# Patient Record
Sex: FEMALE | Race: BLACK OR AFRICAN AMERICAN | NOT HISPANIC OR LATINO | URBAN - METROPOLITAN AREA
[De-identification: names, ages, dates, MRNs, and addresses within clinical notes are randomized per-mention and may not be internally consistent; named-entity substitution may affect disease eponyms.]

---

## 2019-04-12 ENCOUNTER — HOSPITAL ENCOUNTER (OUTPATIENT)
Dept: URGENT CARE | Facility: CLINIC | Age: 49
Discharge: HOME | End: 2019-04-12

## 2019-04-14 LAB — BACTERIA UR CULT: NORMAL

## 2019-05-14 ENCOUNTER — HOSPITAL ENCOUNTER (OUTPATIENT)
Dept: URGENT CARE | Facility: CLINIC | Age: 49
Discharge: HOME | End: 2019-05-14
Attending: FAMILY MEDICINE

## 2020-06-23 ENCOUNTER — HOSPITAL ENCOUNTER (OUTPATIENT)
Dept: URGENT CARE | Facility: CLINIC | Age: 50
Discharge: HOME | End: 2020-06-23
Attending: EMERGENCY MEDICINE

## 2020-06-24 LAB — SARS-COV-2 RNA SPEC QL NAA+PROBE: NOT DETECTED

## 2022-01-11 PROCEDURE — U0004 COV-19 TEST NON-CDC HGH THRU: HCPCS | Performed by: EMERGENCY MEDICINE

## 2022-01-12 ENCOUNTER — TELEPHONE (OUTPATIENT)
Dept: URGENT CARE | Facility: CLINIC | Age: 52
End: 2022-01-12

## 2022-06-09 PROCEDURE — U0004 COV-19 TEST NON-CDC HGH THRU: HCPCS

## 2022-06-10 ENCOUNTER — TELEPHONE (OUTPATIENT)
Dept: URGENT CARE | Facility: CLINIC | Age: 52
End: 2022-06-10

## 2022-09-07 ENCOUNTER — OFFICE VISIT (OUTPATIENT)
Dept: INTERNAL MEDICINE | Facility: CLINIC | Age: 52
End: 2022-09-07

## 2022-09-07 VITALS
BODY MASS INDEX: 29.65 KG/M2 | SYSTOLIC BLOOD PRESSURE: 137 MMHG | OXYGEN SATURATION: 98 % | WEIGHT: 184.5 LBS | TEMPERATURE: 97.8 F | HEIGHT: 66 IN | DIASTOLIC BLOOD PRESSURE: 85 MMHG | HEART RATE: 74 BPM

## 2022-09-07 DIAGNOSIS — N95.1 POST MENOPAUSAL SYNDROME: ICD-10-CM

## 2022-09-07 DIAGNOSIS — Z13.220 SCREENING FOR LIPID DISORDERS: ICD-10-CM

## 2022-09-07 DIAGNOSIS — Z00.00 ANNUAL PHYSICAL EXAM: ICD-10-CM

## 2022-09-07 DIAGNOSIS — Z12.31 ENCOUNTER FOR SCREENING MAMMOGRAM FOR MALIGNANT NEOPLASM OF BREAST: ICD-10-CM

## 2022-09-07 DIAGNOSIS — Z11.59 NEED FOR HEPATITIS C SCREENING TEST: ICD-10-CM

## 2022-09-07 DIAGNOSIS — Z23 ENCOUNTER FOR IMMUNIZATION: ICD-10-CM

## 2022-09-07 DIAGNOSIS — Z00.00 ENCOUNTER FOR MEDICAL EXAMINATION TO ESTABLISH CARE: Primary | ICD-10-CM

## 2022-09-07 DIAGNOSIS — Z12.11 SCREENING FOR COLON CANCER: ICD-10-CM

## 2022-09-07 DIAGNOSIS — N95.1 HOT FLASHES DUE TO MENOPAUSE: ICD-10-CM

## 2022-09-07 LAB
BASOPHILS # BLD AUTO: 0.03 10*3/MM3 (ref 0–0.2)
BASOPHILS NFR BLD AUTO: 0.7 % (ref 0–1.5)
DEPRECATED RDW RBC AUTO: 39.1 FL (ref 37–54)
EOSINOPHIL # BLD AUTO: 0.06 10*3/MM3 (ref 0–0.4)
EOSINOPHIL NFR BLD AUTO: 1.3 % (ref 0.3–6.2)
ERYTHROCYTE [DISTWIDTH] IN BLOOD BY AUTOMATED COUNT: 12.3 % (ref 12.3–15.4)
HCT VFR BLD AUTO: 41.4 % (ref 34–46.6)
HGB BLD-MCNC: 13.4 G/DL (ref 12–15.9)
IMM GRANULOCYTES # BLD AUTO: 0.01 10*3/MM3 (ref 0–0.05)
IMM GRANULOCYTES NFR BLD AUTO: 0.2 % (ref 0–0.5)
LYMPHOCYTES # BLD AUTO: 1.63 10*3/MM3 (ref 0.7–3.1)
LYMPHOCYTES NFR BLD AUTO: 36.5 % (ref 19.6–45.3)
MCH RBC QN AUTO: 28 PG (ref 26.6–33)
MCHC RBC AUTO-ENTMCNC: 32.4 G/DL (ref 31.5–35.7)
MCV RBC AUTO: 86.4 FL (ref 79–97)
MONOCYTES # BLD AUTO: 0.34 10*3/MM3 (ref 0.1–0.9)
MONOCYTES NFR BLD AUTO: 7.6 % (ref 5–12)
NEUTROPHILS NFR BLD AUTO: 2.39 10*3/MM3 (ref 1.7–7)
NEUTROPHILS NFR BLD AUTO: 53.7 % (ref 42.7–76)
NRBC BLD AUTO-RTO: 0 /100 WBC (ref 0–0.2)
PLATELET # BLD AUTO: 245 10*3/MM3 (ref 140–450)
PMV BLD AUTO: 11.6 FL (ref 6–12)
RBC # BLD AUTO: 4.79 10*6/MM3 (ref 3.77–5.28)
WBC NRBC COR # BLD: 4.46 10*3/MM3 (ref 3.4–10.8)

## 2022-09-07 PROCEDURE — 85025 COMPLETE CBC W/AUTO DIFF WBC: CPT | Performed by: NURSE PRACTITIONER

## 2022-09-07 PROCEDURE — 86803 HEPATITIS C AB TEST: CPT | Performed by: NURSE PRACTITIONER

## 2022-09-07 PROCEDURE — 84443 ASSAY THYROID STIM HORMONE: CPT | Performed by: NURSE PRACTITIONER

## 2022-09-07 PROCEDURE — 80061 LIPID PANEL: CPT | Performed by: NURSE PRACTITIONER

## 2022-09-07 PROCEDURE — 80053 COMPREHEN METABOLIC PANEL: CPT | Performed by: NURSE PRACTITIONER

## 2022-09-07 PROCEDURE — 99396 PREV VISIT EST AGE 40-64: CPT | Performed by: NURSE PRACTITIONER

## 2022-09-07 RX ORDER — CITALOPRAM 20 MG/1
20 TABLET ORAL DAILY
Qty: 90 TABLET | Refills: 1 | Status: SHIPPED | OUTPATIENT
Start: 2022-09-07 | End: 2023-03-01

## 2022-09-07 NOTE — PROGRESS NOTES
"Chief Complaint  Establish Care - Postmenopausal     Subjective          Arabella Soto presents to [facility]  History of Present Illness    Previous PCP: patient does not remember who previous PCP was  Specialist(s): none currently   COVID vaccine: completed, including first booster  Pneumonia vaccine: never  Shingles vaccine: never  Colon cancer screening: has never completed, no FHx of colon cancer  Mammogram: has never had one  Pap Smear: about 2009  [place] 11 years     52-year-old female patient presents to the clinic today to establish care.  Patient states that she has not seen a primary care provider in many years.  Patient states that her only concern at this time is postmenopausal symptoms such as hot flashes which are her biggest annoyance at this time.  Patient was inquiring about hormone replacement therapy.  Denies any other concerns at this time including chest pain, shortness of breath, lower extremity edema, nausea, vomiting, abdominal pain.    Current Outpatient Medications   Medication Instructions   • ASPIRIN PO 1 tablet, Oral, Daily, chewable    • atorvastatin (LIPITOR) 20 mg, Oral, Nightly   • citalopram (CELEXA) 20 mg, Oral, Daily, Take 1/2 tab daily x5 days then increase to 1 tab daily       The following portions of the patient's history were reviewed and updated as appropriate: allergies, current medications, past family history, past medical history, past social history, past surgical history, and problem list.    Objective   Vital Signs:   /85   Pulse 74   Temp 97.8 °F (36.6 °C) (Temporal)   Ht 166.4 cm (65.5\")   Wt 83.7 kg (184 lb 8 oz)   SpO2 98%   BMI 30.24 kg/m²     Wt Readings from Last 3 Encounters:   09/07/22 83.7 kg (184 lb 8 oz)   06/09/22 84.7 kg (186 lb 12.8 oz)   01/11/22 82.1 kg (181 lb)     BP Readings from Last 3 Encounters:   09/07/22 137/85   06/09/22 126/80   01/11/22 129/87     Physical Exam "   Appearance: No acute distress, well-nourished  Head: normocephalic, atraumatic  Eyes: extraocular movements intact, no scleral icterus, no conjunctival injection  Ears, Nose, and Throat: external ears normal, nares patent, moist mucous membranes  Cardiovascular: regular rate and rhythm. no murmurs, rubs, or gallops. no edema  Respiratory: breathing comfortably, symmetric chest rise, clear to auscultation bilaterally. No wheezes, rales, or rhonchi.  Neuro: alert and oriented to time, place, and person. Normal gait  Psych: normal mood and affect     Result Review :   The following data was reviewed by: LACHELLE Gil on 09/07/2022:  Common labs    Common Labsle 9/7/22 9/7/22 9/7/22    1216 1216 1216   Glucose  86    BUN  10    Creatinine  0.77    Sodium  138    Potassium  4.0    Chloride  101    Calcium  9.7    Albumin  4.50    Total Bilirubin  <0.2    Alkaline Phosphatase  117    AST (SGOT)  15    ALT (SGPT)  14    WBC 4.46     Hemoglobin 13.4     Hematocrit 41.4     Platelets 245     Total Cholesterol   228 (A)   Triglycerides   87   HDL Cholesterol   50   LDL Cholesterol    163 (A)   (A) Abnormal value                   Lab Results   Component Value Date    SARSANTIGEN Not Detected 06/09/2022    COVID19 Detected (C) 06/09/2022    RAPFLUA Negative 06/09/2022    RAPFLUB Negative 06/09/2022    RAPSCRN Negative 06/09/2022       Procedures        Assessment and Plan    Diagnoses and all orders for this visit:    1. Encounter for medical examination to establish care (Primary)    2. Screening for lipid disorders  -     Lipid Panel    3. Annual physical exam  -     CBC & Differential  -     TSH Rfx On Abnormal To Free T4  -     Comprehensive Metabolic Panel    4. Need for hepatitis C screening test  -     HCV Antibody Rfx To Qnt PCR  -     Interpretation:    5. Screening for colon cancer  -     Ambulatory Referral For Screening Colonoscopy    6. Encounter for screening mammogram for malignant neoplasm of  breast  -     Mammo Screening Bilateral With CAD; Future    7. Encounter for immunization  -     Shingrix Vaccine    8. Post menopausal syndrome  Comments:  new dx   Orders:  -     citalopram (CeleXA) 20 MG tablet; Take 1 tablet by mouth Daily. Take 1/2 tab daily x5 days then increase to 1 tab daily  Dispense: 90 tablet; Refill: 1    9. Hot flashes due to menopause  -     citalopram (CeleXA) 20 MG tablet; Take 1 tablet by mouth Daily. Take 1/2 tab daily x5 days then increase to 1 tab daily  Dispense: 90 tablet; Refill: 1      Explained risk and benefits of hormone replacement therapy and that at did not feel that the risk associated with increased cancer risk were worth the benefit to her.  Patient agreeable to trial Celexa to target postmenopausal symptoms.    Advised on diet, physical activity, sunscreen, helmet, texting and driving, etc    There are no discontinued medications.       Follow Up   Return in about 3 months (around 12/7/2022) for Post menopausal , PAP; recheck BP (elevated).  Patient was given instructions and counseling regarding her condition or for health maintenance advice. Please see specific information pulled into the AVS if appropriate.       Ana Crowe, APRN  09/13/22  12:26 EDT

## 2022-09-08 LAB
ALBUMIN SERPL-MCNC: 4.5 G/DL (ref 3.5–5.2)
ALBUMIN/GLOB SERPL: 1.5 G/DL
ALP SERPL-CCNC: 117 U/L (ref 39–117)
ALT SERPL W P-5'-P-CCNC: 14 U/L (ref 1–33)
ANION GAP SERPL CALCULATED.3IONS-SCNC: 9 MMOL/L (ref 5–15)
AST SERPL-CCNC: 15 U/L (ref 1–32)
BILIRUB SERPL-MCNC: <0.2 MG/DL (ref 0–1.2)
BUN SERPL-MCNC: 10 MG/DL (ref 6–20)
BUN/CREAT SERPL: 13 (ref 7–25)
CALCIUM SPEC-SCNC: 9.7 MG/DL (ref 8.6–10.5)
CHLORIDE SERPL-SCNC: 101 MMOL/L (ref 98–107)
CHOLEST SERPL-MCNC: 228 MG/DL (ref 0–200)
CO2 SERPL-SCNC: 28 MMOL/L (ref 22–29)
CREAT SERPL-MCNC: 0.77 MG/DL (ref 0.57–1)
EGFRCR SERPLBLD CKD-EPI 2021: 92.9 ML/MIN/1.73
GLOBULIN UR ELPH-MCNC: 3 GM/DL
GLUCOSE SERPL-MCNC: 86 MG/DL (ref 65–99)
HDLC SERPL-MCNC: 50 MG/DL (ref 40–60)
LDLC SERPL CALC-MCNC: 163 MG/DL (ref 0–100)
LDLC/HDLC SERPL: 3.21 {RATIO}
POTASSIUM SERPL-SCNC: 4 MMOL/L (ref 3.5–5.2)
PROT SERPL-MCNC: 7.5 G/DL (ref 6–8.5)
SODIUM SERPL-SCNC: 138 MMOL/L (ref 136–145)
TRIGL SERPL-MCNC: 87 MG/DL (ref 0–150)
TSH SERPL DL<=0.05 MIU/L-ACNC: 0.55 UIU/ML (ref 0.27–4.2)
VLDLC SERPL-MCNC: 15 MG/DL (ref 5–40)

## 2022-09-09 ENCOUNTER — TELEPHONE (OUTPATIENT)
Dept: INTERNAL MEDICINE | Facility: CLINIC | Age: 52
End: 2022-09-09

## 2022-09-09 DIAGNOSIS — E78.2 MIXED HYPERLIPIDEMIA: Primary | ICD-10-CM

## 2022-09-09 LAB
HCV AB S/CO SERPL IA: <0.1 S/CO RATIO (ref 0–0.9)
HCV AB SERPL QL IA: NORMAL

## 2022-09-09 RX ORDER — ATORVASTATIN CALCIUM 20 MG/1
20 TABLET, FILM COATED ORAL NIGHTLY
Qty: 90 TABLET | Refills: 3 | Status: SHIPPED | OUTPATIENT
Start: 2022-09-09

## 2022-09-09 NOTE — TELEPHONE ENCOUNTER
----- Message from LACHELLE Gil sent at 9/9/2022 10:15 AM EDT -----    Cholesterol levels elevated. I would like to start patient on cholesterol medication to prevent cardiovascular event such as heart attack or stroke. We will repeat in 3 months. I would also like patient to follow low cholesterol diet and increase physical activity.     Other labs unremarkable.     Foods rich in unsaturated fats: e.g. olive and canola oil, avocados, almonds, pecans, walnuts  Low carbohydrate and fiber rich foods: e.g. whole grains, whole wheat bread and pasta, oatmeal, oat bran, brown rice, barley, legumes  Low carbohydrate and fibre rich foods: e.g. whole grains, whole wheat bread and pasta, oatmeal, oat bran, brown rice, barley, legumes  Fatty fish: e.g. salmon, herring  Phytosterols and stanols: e.g. nuts, seeds, vegetable oils, fortified food products such as orange juice, yogurt, salad dressing  Foods to avoid:    High cholesterol foods: e.g. egg yolks, whole milk products, and organ meats  Canola oil and Other processed vegetable oils  Potato chips and Other packaged Foods  Donnelly and Other processed meats

## 2022-09-09 NOTE — TELEPHONE ENCOUNTER
ATTEMPTED TO CONTACT PATIENT. COULD NOT LEAVE MESSAGE DUE TO NO VOICEMAIL.      HUB OK TO READ/ADVISE:  LACHELLE Crowe sent at 9/9/2022 10:15 AM EDT -----     Cholesterol levels elevated. I would like to start patient on cholesterol medication to prevent cardiovascular event such as heart attack or stroke. We will repeat in 3 months. I would also like patient to follow low cholesterol diet and increase physical activity.      Other labs unremarkable.      Foods rich in unsaturated fats: e.g. olive and canola oil, avocados, almonds, pecans, walnuts  Low carbohydrate and fiber rich foods: e.g. whole grains, whole wheat bread and pasta, oatmeal, oat bran, brown rice, barley, legumes  Low carbohydrate and fibre rich foods: e.g. whole grains, whole wheat bread and pasta, oatmeal, oat bran, brown rice, barley, legumes  Fatty fish: e.g. salmon, herring  Phytosterols and stanols: e.g. nuts, seeds, vegetable oils, fortified food products such as orange juice, yogurt, salad dressing  Foods to avoid:     High cholesterol foods: e.g. egg yolks, whole milk products, and organ meats  Canola oil and Other processed vegetable oils  Potato chips and Other packaged Foods  Donnelly and Other processed meats

## 2022-09-12 ENCOUNTER — TELEPHONE (OUTPATIENT)
Dept: INTERNAL MEDICINE | Facility: CLINIC | Age: 52
End: 2022-09-12

## 2022-10-12 ENCOUNTER — TRANSCRIBE ORDERS (OUTPATIENT)
Dept: ADMINISTRATIVE | Facility: HOSPITAL | Age: 52
End: 2022-10-12

## 2022-10-12 ENCOUNTER — TELEPHONE (OUTPATIENT)
Dept: INTERNAL MEDICINE | Facility: CLINIC | Age: 52
End: 2022-10-12

## 2022-10-12 DIAGNOSIS — Z12.31 ENCOUNTER FOR SCREENING MAMMOGRAM FOR MALIGNANT NEOPLASM OF BREAST: Primary | ICD-10-CM

## 2022-10-12 NOTE — TELEPHONE ENCOUNTER
PATIENT CALLED REGARDING LETTER SENT TO HER IN MAIL PER REFERRAL AND OUR INABILITY TO CONTACT PATIENT, CALL TRANSFERRED TO REFERRALS.

## 2022-10-19 ENCOUNTER — HOSPITAL ENCOUNTER (OUTPATIENT)
Dept: MAMMOGRAPHY | Facility: HOSPITAL | Age: 52
Discharge: HOME | End: 2022-10-19
Admitting: NURSE PRACTITIONER

## 2022-10-19 DIAGNOSIS — Z12.31 ENCOUNTER FOR SCREENING MAMMOGRAM FOR MALIGNANT NEOPLASM OF BREAST: ICD-10-CM

## 2022-10-19 PROCEDURE — 77067 SCR MAMMO BI INCL CAD: CPT

## 2022-10-19 PROCEDURE — 77063 BREAST TOMOSYNTHESIS BI: CPT

## 2022-12-07 NOTE — PROGRESS NOTES
Chief Complaint  Elevated Blood Pressure (3 month follow-up) and Gynecologic Exam    Subjective          Arabella Soto presents to [facility]  Hypertension  This is a new problem. The current episode started today. Pertinent negatives include no anxiety, blurred vision, chest pain, headaches, malaise/fatigue, neck pain, orthopnea, palpitations, peripheral edema, PND, shortness of breath or sweats. Past treatments include nothing. Current antihypertension treatment includes nothing. There is no history of chronic renal disease.   Hyperlipidemia  This is a chronic problem. The problem is uncontrolled. Recent lipid tests were reviewed and are high. She has no history of chronic renal disease, diabetes, hypothyroidism or liver disease. Pertinent negatives include no chest pain or shortness of breath. Current antihyperlipidemic treatment includes exercise, diet change and statins. Improvement on treatment: will recheck lipids today  Compliance problems include adherence to exercise.  Risk factors for coronary artery disease include hypertension and post-menopausal.       Menstrual History  Age of menarche: 12  Cycle length: No longer having cycle, abnormal bleeding  Flow: (light, medium, heavy): Heavy  LMP date: Unknown  Post menopausal?: Currently going through   Age of menopause: 52    Gynecologic History:  Self breast exams: Yes  Recent Mammogram: 10/19/2022  Previous GYN surgery: Ablation, Tubal, Scope of some type  History of infertility: No  Last PAP smear: More than 5 years, probably more than 10 years  Have you ever had an abnormal PAP?: Yes    Contraceptive/Sexual history:   Current contraceptive method?: None  Sexually active?: Yes  Number of partners?: 1  New partner within the last 3 months?: 0  Condom use?: No  History of STDs? Wart or something that was froze off, hx of abnormal pap... HPV?  Painful intercourse?: No    Current Outpatient Medications  "  Medication Instructions   • amLODIPine (NORVASC) 2.5 mg, Oral, Daily   • atorvastatin (LIPITOR) 20 mg, Oral, Nightly   • citalopram (CELEXA) 20 mg, Oral, Daily, Take 1/2 tab daily x5 days then increase to 1 tab daily       The following portions of the patient's history were reviewed and updated as appropriate: allergies, current medications, past family history, past medical history, past social history, past surgical history, and problem list.    Objective   Vital Signs:   /85 (BP Location: Right arm, Patient Position: Sitting, Cuff Size: Adult)   Pulse 71   Temp 97.5 °F (36.4 °C) (Temporal)   Ht 166.4 cm (65.5\")   Wt 78.9 kg (174 lb)   SpO2 97%   BMI 28.51 kg/m²     Wt Readings from Last 3 Encounters:   12/08/22 78.9 kg (174 lb)   09/07/22 83.7 kg (184 lb 8 oz)   06/09/22 84.7 kg (186 lb 12.8 oz)     BP Readings from Last 3 Encounters:   12/08/22 132/85   09/07/22 137/85   06/09/22 126/80     Physical Exam  Vitals and nursing note reviewed. Exam conducted with a chaperone present (Karen Gomez MA).   Constitutional:       Appearance: Normal appearance.   Neck:      Thyroid: No thyroid mass or thyromegaly.   Cardiovascular:      Rate and Rhythm: Normal rate and regular rhythm.      Heart sounds: Normal heart sounds.   Pulmonary:      Effort: Pulmonary effort is normal.      Breath sounds: Normal breath sounds.   Chest:   Breasts:     Right: Normal. No mass, nipple discharge or skin change.      Left: Normal. No mass, nipple discharge or skin change.   Abdominal:      General: Abdomen is flat. Bowel sounds are normal.      Palpations: Abdomen is soft.   Genitourinary:     General: Normal vulva.      Exam position: Lithotomy position.      Vagina: Vaginal discharge (mild white milky ) present.      Cervix: Normal.      Adnexa: Right adnexa normal and left adnexa normal.   Musculoskeletal:      Cervical back: Full passive range of motion without pain.   Skin:     General: Skin is warm and dry.      " Capillary Refill: Capillary refill takes less than 2 seconds.   Neurological:      Mental Status: She is alert. Mental status is at baseline.   Psychiatric:         Mood and Affect: Mood normal.         Behavior: Behavior normal.         Thought Content: Thought content normal.         Judgment: Judgment normal.              Result Review :   The following data was reviewed by: LACHELLE Gil on 12/08/2022:  Common labs    Common Labs 9/7/22 9/7/22 9/7/22    1216 1216 1216   Glucose  86    BUN  10    Creatinine  0.77    Sodium  138    Potassium  4.0    Chloride  101    Calcium  9.7    Albumin  4.50    Total Bilirubin  <0.2    Alkaline Phosphatase  117    AST (SGOT)  15    ALT (SGPT)  14    WBC 4.46     Hemoglobin 13.4     Hematocrit 41.4     Platelets 245     Total Cholesterol   228 (A)   Triglycerides   87   HDL Cholesterol   50   LDL Cholesterol    163 (A)   (A) Abnormal value                   Lab Results   Component Value Date    SARSANTIGEN Not Detected 06/09/2022    COVID19 Detected (C) 06/09/2022    RAPFLUA Negative 06/09/2022    RAPFLUB Negative 06/09/2022    RAPSCRN Negative 06/09/2022       Procedures        Assessment and Plan    Diagnoses and all orders for this visit:    1. Encounter for gynecological examination with Papanicolaou smear of cervix (Primary)  -     CBC & Differential  -     TSH Rfx On Abnormal To Free T4  -     Comprehensive Metabolic Panel  -     IGP,rfx Aptima HPV All Pth    2. Mixed hyperlipidemia  Comments:  will recheck lipids today and adjust medications depending on results.   Orders:  -     Lipid Panel    3. Vaginal discharge  -     Gardnerella vaginalis, Trichomonas vaginalis, Candida albicans, DNA - Swab, Vagina    4. Hypertension, unspecified type  Comments:  New dx today; initiate meds; DASH diet and exercsie   Orders:  -     amLODIPine (NORVASC) 2.5 MG tablet; Take 1 tablet by mouth Daily.  Dispense: 30 tablet; Refill: 1    5. Encounter for immunization  -     Tdap  Vaccine Greater Than or Equal To 6yo IM          Medications Discontinued During This Encounter   Medication Reason   • ASPIRIN PO           Follow Up   Return in about 4 weeks (around 1/5/2023) for Hypertension.  Patient was given instructions and counseling regarding her condition or for health maintenance advice. Please see specific information pulled into the AVS if appropriate.       Ana Crowe, LACHELLE  12/08/22  10:25 EST

## 2022-12-08 ENCOUNTER — OFFICE VISIT (OUTPATIENT)
Dept: INTERNAL MEDICINE | Facility: CLINIC | Age: 52
End: 2022-12-08

## 2022-12-08 VITALS
BODY MASS INDEX: 27.97 KG/M2 | HEART RATE: 71 BPM | SYSTOLIC BLOOD PRESSURE: 132 MMHG | DIASTOLIC BLOOD PRESSURE: 85 MMHG | OXYGEN SATURATION: 97 % | WEIGHT: 174 LBS | TEMPERATURE: 97.5 F | HEIGHT: 66 IN

## 2022-12-08 DIAGNOSIS — N89.8 VAGINAL DISCHARGE: ICD-10-CM

## 2022-12-08 DIAGNOSIS — I10 HYPERTENSION, UNSPECIFIED TYPE: ICD-10-CM

## 2022-12-08 DIAGNOSIS — E78.2 MIXED HYPERLIPIDEMIA: Chronic | ICD-10-CM

## 2022-12-08 DIAGNOSIS — Z01.419 ENCOUNTER FOR GYNECOLOGICAL EXAMINATION WITH PAPANICOLAOU SMEAR OF CERVIX: Primary | ICD-10-CM

## 2022-12-08 DIAGNOSIS — Z23 ENCOUNTER FOR IMMUNIZATION: ICD-10-CM

## 2022-12-08 LAB
CANDIDA SPECIES: NEGATIVE
GARDNERELLA VAGINALIS: POSITIVE
T VAGINALIS DNA VAG QL PROBE+SIG AMP: NEGATIVE

## 2022-12-08 PROCEDURE — 99214 OFFICE O/P EST MOD 30 MIN: CPT | Performed by: NURSE PRACTITIONER

## 2022-12-08 PROCEDURE — 80061 LIPID PANEL: CPT | Performed by: NURSE PRACTITIONER

## 2022-12-08 PROCEDURE — 87480 CANDIDA DNA DIR PROBE: CPT | Performed by: NURSE PRACTITIONER

## 2022-12-08 PROCEDURE — 85025 COMPLETE CBC W/AUTO DIFF WBC: CPT | Performed by: NURSE PRACTITIONER

## 2022-12-08 PROCEDURE — 80053 COMPREHEN METABOLIC PANEL: CPT | Performed by: NURSE PRACTITIONER

## 2022-12-08 PROCEDURE — 84443 ASSAY THYROID STIM HORMONE: CPT | Performed by: NURSE PRACTITIONER

## 2022-12-08 PROCEDURE — 87660 TRICHOMONAS VAGIN DIR PROBE: CPT | Performed by: NURSE PRACTITIONER

## 2022-12-08 PROCEDURE — 87510 GARDNER VAG DNA DIR PROBE: CPT | Performed by: NURSE PRACTITIONER

## 2022-12-08 PROCEDURE — 90471 IMMUNIZATION ADMIN: CPT | Performed by: NURSE PRACTITIONER

## 2022-12-08 PROCEDURE — 90715 TDAP VACCINE 7 YRS/> IM: CPT | Performed by: NURSE PRACTITIONER

## 2022-12-08 PROCEDURE — G0123 SCREEN CERV/VAG THIN LAYER: HCPCS | Performed by: NURSE PRACTITIONER

## 2022-12-08 RX ORDER — AMLODIPINE BESYLATE 2.5 MG/1
2.5 TABLET ORAL DAILY
Qty: 30 TABLET | Refills: 1 | Status: SHIPPED | OUTPATIENT
Start: 2022-12-08 | End: 2023-01-17

## 2022-12-09 ENCOUNTER — TELEPHONE (OUTPATIENT)
Dept: INTERNAL MEDICINE | Facility: CLINIC | Age: 52
End: 2022-12-09

## 2022-12-09 DIAGNOSIS — B96.89 BACTERIAL VAGINOSIS: Primary | ICD-10-CM

## 2022-12-09 DIAGNOSIS — N76.0 BACTERIAL VAGINOSIS: Primary | ICD-10-CM

## 2022-12-09 LAB
ALBUMIN SERPL-MCNC: 4.6 G/DL (ref 3.5–5.2)
ALBUMIN/GLOB SERPL: 1.7 G/DL
ALP SERPL-CCNC: 120 U/L (ref 39–117)
ALT SERPL W P-5'-P-CCNC: 11 U/L (ref 1–33)
ANION GAP SERPL CALCULATED.3IONS-SCNC: 9 MMOL/L (ref 5–15)
AST SERPL-CCNC: 21 U/L (ref 1–32)
BASOPHILS # BLD AUTO: 0.02 10*3/MM3 (ref 0–0.2)
BASOPHILS NFR BLD AUTO: 0.4 % (ref 0–1.5)
BILIRUB SERPL-MCNC: 0.3 MG/DL (ref 0–1.2)
BUN SERPL-MCNC: 12 MG/DL (ref 6–20)
BUN/CREAT SERPL: 16.2 (ref 7–25)
CALCIUM SPEC-SCNC: 9.6 MG/DL (ref 8.6–10.5)
CHLORIDE SERPL-SCNC: 102 MMOL/L (ref 98–107)
CHOLEST SERPL-MCNC: 130 MG/DL (ref 0–200)
CO2 SERPL-SCNC: 27 MMOL/L (ref 22–29)
CREAT SERPL-MCNC: 0.74 MG/DL (ref 0.57–1)
DEPRECATED RDW RBC AUTO: 39.7 FL (ref 37–54)
EGFRCR SERPLBLD CKD-EPI 2021: 97.5 ML/MIN/1.73
EOSINOPHIL # BLD AUTO: 0.05 10*3/MM3 (ref 0–0.4)
EOSINOPHIL NFR BLD AUTO: 1.1 % (ref 0.3–6.2)
ERYTHROCYTE [DISTWIDTH] IN BLOOD BY AUTOMATED COUNT: 12.7 % (ref 12.3–15.4)
GLOBULIN UR ELPH-MCNC: 2.7 GM/DL
GLUCOSE SERPL-MCNC: 68 MG/DL (ref 65–99)
HCT VFR BLD AUTO: 40.3 % (ref 34–46.6)
HDLC SERPL-MCNC: 46 MG/DL (ref 40–60)
HGB BLD-MCNC: 13.4 G/DL (ref 12–15.9)
IMM GRANULOCYTES # BLD AUTO: 0.01 10*3/MM3 (ref 0–0.05)
IMM GRANULOCYTES NFR BLD AUTO: 0.2 % (ref 0–0.5)
LDLC SERPL CALC-MCNC: 73 MG/DL (ref 0–100)
LDLC/HDLC SERPL: 1.63 {RATIO}
LYMPHOCYTES # BLD AUTO: 1.19 10*3/MM3 (ref 0.7–3.1)
LYMPHOCYTES NFR BLD AUTO: 25.9 % (ref 19.6–45.3)
MCH RBC QN AUTO: 28.7 PG (ref 26.6–33)
MCHC RBC AUTO-ENTMCNC: 33.3 G/DL (ref 31.5–35.7)
MCV RBC AUTO: 86.3 FL (ref 79–97)
MONOCYTES # BLD AUTO: 0.4 10*3/MM3 (ref 0.1–0.9)
MONOCYTES NFR BLD AUTO: 8.7 % (ref 5–12)
NEUTROPHILS NFR BLD AUTO: 2.93 10*3/MM3 (ref 1.7–7)
NEUTROPHILS NFR BLD AUTO: 63.7 % (ref 42.7–76)
NRBC BLD AUTO-RTO: 0 /100 WBC (ref 0–0.2)
PLATELET # BLD AUTO: 238 10*3/MM3 (ref 140–450)
PMV BLD AUTO: 12 FL (ref 6–12)
POTASSIUM SERPL-SCNC: 4 MMOL/L (ref 3.5–5.2)
PROT SERPL-MCNC: 7.3 G/DL (ref 6–8.5)
RBC # BLD AUTO: 4.67 10*6/MM3 (ref 3.77–5.28)
SODIUM SERPL-SCNC: 138 MMOL/L (ref 136–145)
TRIGL SERPL-MCNC: 45 MG/DL (ref 0–150)
TSH SERPL DL<=0.05 MIU/L-ACNC: 0.53 UIU/ML (ref 0.27–4.2)
VLDLC SERPL-MCNC: 11 MG/DL (ref 5–40)
WBC NRBC COR # BLD: 4.6 10*3/MM3 (ref 3.4–10.8)

## 2022-12-09 RX ORDER — METRONIDAZOLE 500 MG/1
500 TABLET ORAL 2 TIMES DAILY
Qty: 14 TABLET | Refills: 0 | Status: SHIPPED | OUTPATIENT
Start: 2022-12-09 | End: 2022-12-16

## 2022-12-09 NOTE — TELEPHONE ENCOUNTER
----- Message from LACHELLE Gil sent at 12/9/2022  7:51 AM EST -----  Positive for bacterial vaginosis. This is not a sexually transmitted disease. It is an overgrowth of bacteria within the vagina.   I am sending in flagyl (which is an antibiotic) x 7 days.   Avoid intercourse until completion of the antibiotic.   Also avoid alcohol while taking the Flagyl.       Other labs reassuring

## 2022-12-09 NOTE — TELEPHONE ENCOUNTER
Attempted to contact pt regarding lab results. Pt  picked up. I confirmed pt [facility] VERBAL on file for her spouse to receive information. I informed pt spouse of the below note. Pt stated his wife may call us on lunch if she has questions. I voiced this is ok.     HUB OK TO READ/ADVISE:  ----- Message from LACHELLE Gil sent at 12/9/2022  7:51 AM EST -----  Positive for bacterial vaginosis. This is not a sexually transmitted disease. It is an overgrowth of bacteria within the vagina.   I am sending in flagyl (which is an antibiotic) x 7 days.   Avoid intercourse until completion of the antibiotic.   Also avoid alcohol while taking the Flagyl.       Other labs reassuring

## 2022-12-12 NOTE — TELEPHONE ENCOUNTER
Attempted to contact patient. Could not leave message due to no voicemail being set up.       HUB OK TO READ/ADVISE:  ----- Message from LACHELLE Gil sent at 12/9/2022  7:51 AM EST -----  Positive for bacterial vaginosis. This is not a sexually transmitted disease. It is an overgrowth of bacteria within the vagina.   I am sending in flagyl (which is an antibiotic) x 7 days.   Avoid intercourse until completion of the antibiotic.   Also avoid alcohol while taking the Flagyl.         Other labs reassuring

## 2022-12-12 NOTE — TELEPHONE ENCOUNTER
Caller: DIOGENES ALCANTARA    Relationship: Emergency Contact    Best call back number: [phone]    What is the best time to reach you: ANYTIME    Who are you requesting to speak with (clinical staff, provider,  specific staff member): CLINICAL    What was the call regarding: PATIENT IS ALREADY INFORMED OF RESULTS AND GOT THE MEDICATION FOR THIS TO START TREATMENT OF THIS.     Do you require a callback: IF NEEDED

## 2022-12-16 ENCOUNTER — TELEPHONE (OUTPATIENT)
Dept: INTERNAL MEDICINE | Facility: CLINIC | Age: 52
End: 2022-12-16

## 2022-12-16 LAB
CONV .: NORMAL
CYTOLOGIST CVX/VAG CYTO: NORMAL
CYTOLOGY CVX/VAG DOC CYTO: NORMAL
CYTOLOGY CVX/VAG DOC THIN PREP: NORMAL
DX ICD CODE: NORMAL
HIV 1 & 2 AB SER-IMP: NORMAL
OTHER STN SPEC: NORMAL
STAT OF ADQ CVX/VAG CYTO-IMP: NORMAL

## 2022-12-16 NOTE — TELEPHONE ENCOUNTER
----- Message from LACHELLE Gil sent at 12/16/2022  3:30 PM EST -----  PAP reassuring. Repeat in 3 years

## 2022-12-16 NOTE — TELEPHONE ENCOUNTER
Attempted to contact patient regarding lab results. I WAS UNABLE TO LEAVE A VOICEMAIL AS HER VOICEMAIL BOX WAS NOT SET UP YET. WILL TRY AGAIN AT A LATER DAY.

## 2022-12-19 NOTE — TELEPHONE ENCOUNTER
Attempted to contact patient. Could not leave message due to no voicemail being set up.       HUB OK TO READ/ADVISE:  PAP reassuring. Repeat in 3 years

## 2022-12-20 NOTE — TELEPHONE ENCOUNTER
3rd attempt to contact patient. Could not leave message due to no voicemail being set up.         HUB OK TO READ/ADVISE:  PAP reassuring. Repeat in 3 years

## 2023-01-13 DIAGNOSIS — I10 HYPERTENSION, UNSPECIFIED TYPE: ICD-10-CM

## 2023-01-17 RX ORDER — AMLODIPINE BESYLATE 2.5 MG/1
2.5 TABLET ORAL DAILY
Qty: 90 TABLET | Refills: 1 | Status: SHIPPED | OUTPATIENT
Start: 2023-01-17

## 2023-03-01 DIAGNOSIS — N95.1 POST MENOPAUSAL SYNDROME: ICD-10-CM

## 2023-03-01 DIAGNOSIS — N95.1 HOT FLASHES DUE TO MENOPAUSE: ICD-10-CM

## 2023-03-01 RX ORDER — CITALOPRAM 20 MG/1
TABLET ORAL
Qty: 90 TABLET | Refills: 1 | Status: SHIPPED | OUTPATIENT
Start: 2023-03-01

## 2023-08-28 DIAGNOSIS — E78.2 MIXED HYPERLIPIDEMIA: ICD-10-CM

## 2023-08-28 DIAGNOSIS — N95.1 HOT FLASHES DUE TO MENOPAUSE: ICD-10-CM

## 2023-08-28 DIAGNOSIS — N95.1 POST MENOPAUSAL SYNDROME: ICD-10-CM

## 2023-08-28 RX ORDER — ATORVASTATIN CALCIUM 20 MG/1
20 TABLET, FILM COATED ORAL NIGHTLY
Qty: 90 TABLET | Refills: 3 | Status: SHIPPED | OUTPATIENT
Start: 2023-08-28

## 2023-08-28 RX ORDER — CITALOPRAM 20 MG/1
20 TABLET ORAL DAILY
Qty: 90 TABLET | Refills: 1 | Status: SHIPPED | OUTPATIENT
Start: 2023-08-28

## 2023-09-24 DIAGNOSIS — I10 HYPERTENSION, UNSPECIFIED TYPE: ICD-10-CM

## 2023-09-27 RX ORDER — AMLODIPINE BESYLATE 2.5 MG/1
2.5 TABLET ORAL DAILY
Qty: 90 TABLET | Refills: 1 | Status: SHIPPED | OUTPATIENT
Start: 2023-09-27

## 2024-07-26 ENCOUNTER — TELEPHONE (OUTPATIENT)
Dept: URGENT CARE | Facility: CLINIC | Age: 54
End: 2024-07-26
Payer: OTHER GOVERNMENT

## 2024-11-18 DIAGNOSIS — E78.2 MIXED HYPERLIPIDEMIA: ICD-10-CM

## 2024-11-18 RX ORDER — ATORVASTATIN CALCIUM 20 MG/1
20 TABLET, FILM COATED ORAL NIGHTLY
Qty: 90 TABLET | Refills: 3 | Status: SHIPPED | OUTPATIENT
Start: 2024-11-18